# Patient Record
Sex: FEMALE | Race: WHITE | Employment: FULL TIME | URBAN - METROPOLITAN AREA
[De-identification: names, ages, dates, MRNs, and addresses within clinical notes are randomized per-mention and may not be internally consistent; named-entity substitution may affect disease eponyms.]

---

## 2020-07-01 ENCOUNTER — HOSPITAL ENCOUNTER (OUTPATIENT)
Age: 60
Discharge: HOME | End: 2020-07-01
Payer: COMMERCIAL

## 2020-07-01 ENCOUNTER — HOSPITAL ENCOUNTER (OUTPATIENT)
Dept: GENERAL RADIOLOGY | Age: 60
Discharge: HOME | End: 2020-07-01
Payer: COMMERCIAL

## 2020-07-01 PROCEDURE — 73501 X-RAY EXAM HIP UNI 1 VIEW: CPT

## 2020-07-01 PROCEDURE — 73564 X-RAY EXAM KNEE 4 OR MORE: CPT

## 2020-07-22 ENCOUNTER — OFFICE VISIT (OUTPATIENT)
Dept: ORTHOPEDIC SURGERY | Age: 60
End: 2020-07-22
Payer: COMMERCIAL

## 2020-07-22 VITALS — RESPIRATION RATE: 18 BRPM | HEIGHT: 69 IN | BODY MASS INDEX: 34.36 KG/M2 | WEIGHT: 232 LBS

## 2020-07-22 PROCEDURE — 20610 DRAIN/INJ JOINT/BURSA W/O US: CPT | Performed by: ORTHOPAEDIC SURGERY

## 2020-07-22 PROCEDURE — 99203 OFFICE O/P NEW LOW 30 MIN: CPT | Performed by: ORTHOPAEDIC SURGERY

## 2020-07-22 RX ORDER — POTASSIUM CHLORIDE 600 MG/1
TABLET, FILM COATED, EXTENDED RELEASE ORAL
COMMUNITY
Start: 2020-07-21

## 2020-07-22 RX ORDER — IBUPROFEN 200 MG
200 TABLET ORAL EVERY 6 HOURS PRN
COMMUNITY

## 2020-07-22 RX ORDER — BIMATOPROST 0.3 MG/ML
SOLUTION/ DROPS OPHTHALMIC
COMMUNITY
Start: 2020-07-21 | End: 2020-07-22

## 2020-07-22 RX ORDER — HYDROCHLOROTHIAZIDE 25 MG/1
TABLET ORAL
COMMUNITY
Start: 2020-07-21

## 2020-07-22 RX ORDER — GRAPE SEED EXT/BIOFLAV,CITRUS 50MG-250MG
CAPSULE ORAL
COMMUNITY

## 2020-07-22 RX ORDER — LATANOPROST 50 UG/ML
SOLUTION/ DROPS OPHTHALMIC
COMMUNITY
Start: 2020-07-05

## 2020-07-22 RX ORDER — LEVOTHYROXINE SODIUM 100 UG/1
TABLET ORAL
COMMUNITY
Start: 2020-07-06

## 2020-07-22 ASSESSMENT — ENCOUNTER SYMPTOMS
SHORTNESS OF BREATH: 0
EYE PAIN: 0
CHEST TIGHTNESS: 0
WHEEZING: 0
VOMITING: 0
COLOR CHANGE: 0
EYE REDNESS: 0

## 2020-07-22 NOTE — PROGRESS NOTES
7/22/2020   Chief Complaint   Patient presents with    Hip Pain     left hip pain x 2 years     Knee Pain     Right         History of Present Illness:                             Anabel Parker is a 63 y.o. female who presents today for evaluation of her left hip and right knee pain. The left hip has been painful for about 2 years and getting progressively worse with time. Her symptoms used to respond to chiropractic treatments but these have not been successful lately. She complains of a deep aching pain at the hip and stiffness issues with rotational movements of the hip. She has a constant aching pain that is worse with repetitive standing and walking activities. She has had no treatments for her hip in the past    Additionally she has dealt with progressive worsening right knee pain and stiffness and occasional swelling as well. She has a history of previous meniscus tear in the right knee which was treated with knee arthroscopy about 3 or 4 years ago. Initially she did well after the surgery but had a injury a few years ago where she landed onto a flexed knee and had worsening of her pain and swelling after the fall. Her knee has continued to give her trouble with pain swelling and stiffness since that injury. Her knee pain is worse on the lateral aspect and she occasionally gets catching and popping. The pain does travel down the lateral aspect of her shin which is worse with prolonged standing activities. Patient here for a new patient visit for evaluation of left hip pain. Onset has been for about 2 years or so with NKI or direct cause. She points to posterior lateral aspect into the groin. She reports no prior history of surgeries or injuries to this hip. She takes OTC motrin PRN otherwise no consecutive treatment on the left hip.         She also c/o chronic right knee pain.         Medical History  Patient's medications, allergies, past medical, surgical, social and family histories were reviewed and updated as appropriate. No past medical history on file. No family history on file. Social History     Socioeconomic History    Marital status: Single     Spouse name: None    Number of children: None    Years of education: None    Highest education level: None   Occupational History    None   Social Needs    Financial resource strain: None    Food insecurity     Worry: None     Inability: None    Transportation needs     Medical: None     Non-medical: None   Tobacco Use    Smoking status: Former Smoker     Last attempt to quit:      Years since quittin.5    Smokeless tobacco: Never Used   Substance and Sexual Activity    Alcohol use: None    Drug use: None    Sexual activity: None   Lifestyle    Physical activity     Days per week: None     Minutes per session: None    Stress: None   Relationships    Social connections     Talks on phone: None     Gets together: None     Attends Restorationist service: None     Active member of club or organization: None     Attends meetings of clubs or organizations: None     Relationship status: None    Intimate partner violence     Fear of current or ex partner: None     Emotionally abused: None     Physically abused: None     Forced sexual activity: None   Other Topics Concern    None   Social History Narrative    None     Current Outpatient Medications   Medication Sig Dispense Refill    hydroCHLOROthiazide (HYDRODIURIL) 25 MG tablet       UNITHROID 100 MCG tablet       potassium chloride (KLOR-CON) 8 MEQ extended release tablet       Black Cohosh 540 MG CAPS Take one capsule daily by mouth      latanoprost (XALATAN) 0.005 % ophthalmic solution       ibuprofen (ADVIL;MOTRIN) 200 MG tablet Take 200 mg by mouth every 6 hours as needed for Pain       No current facility-administered medications for this visit.       Allergies   Allergen Reactions    Penicillin G      Other reaction(s): Throat closes (severe)       Review of Systems:   Review of Systems   Constitutional: Negative for chills and fever. HENT: Negative for congestion and sneezing. Eyes: Negative for pain and redness. Respiratory: Negative for chest tightness, shortness of breath and wheezing. Cardiovascular: Negative for chest pain and palpitations. Gastrointestinal: Negative for vomiting. Musculoskeletal: Positive for arthralgias. Skin: Negative for color change and rash. Psychiatric/Behavioral: Negative for agitation. The patient is not nervous/anxious. Examination:  General Exam:  Vitals: Resp 18   Ht 5' 8.5\" (1.74 m)   Wt 232 lb (105.2 kg)   BMI 34.76 kg/m²    Physical Exam  Vitals signs and nursing note reviewed. Constitutional:       Appearance: Normal appearance. HENT:      Head: Normocephalic and atraumatic. Eyes:      Conjunctiva/sclera: Conjunctivae normal.      Pupils: Pupils are equal, round, and reactive to light. Neck:      Musculoskeletal: Normal range of motion. Pulmonary:      Effort: Pulmonary effort is normal.   Musculoskeletal:      Right hip: She exhibits normal range of motion, normal strength, no tenderness, no bony tenderness, no swelling, no crepitus and no deformity. Left hip: She exhibits decreased range of motion, decreased strength and tenderness. Left knee: She exhibits normal range of motion, no swelling, no effusion, no ecchymosis, no deformity, no laceration, normal alignment, no LCL laxity, normal meniscus and no MCL laxity. No tenderness found. No medial joint line and no lateral joint line tenderness noted. Comments: Left Lower Extremity:  There is moderate tenderness to palpation diffusely throughout the hip both anteriorly and posteriorly. Range of motion is significantly limited and painful at the extremes of motion.   Hip flexion present to 90 degrees, abduction 20 degrees, extension 5 degrees, internal rotation 10 degrees, external rotation 10 degrees. Strength is 5 out of 5 with hip flexion, extension, and abduction however this is somewhat limited due to pain with resistance testing. IRIS and FADIR test reproduces pain to the groin and hip joint. Sensation is intact to light touch in the left lower extremity. Pulses are intact. Skin is intact without erythema. No lower extremity edema. Right Lower Extremity:    There is moderate to severe tenderness to palpation diffusely throughout the knee, most significant along the lateral joint line. There is a moderate knee joint effusion present and mild global swelling anteriorly. Mild restricted range of motion at the knee with approximately 5 degrees lack of full extension and knee flexion up to 120 degrees with pain at the extremes of motion. There is mild crepitation during active range of motion. There is moderate valgus knee alignment. Well-healed surgical scars from previous knee arthroscopy. There is 5 out of 5 strength with knee flexion and extension. There is mild instability to varus and valgus stress testing with no laxity of the MCL but there is a painful clicking during this maneuver. With passive manipulation the knee is correctable to neutral alignment but with weightbearing she collapses into a valgus position. There is no instability with anterior and posterior drawer testing. Sensation is intact to light touch throughout the lower extremity. There is positive lateral Merrick's test with tenderness to palpation and pain along the medial joint line. Skin is intact. Pulses are intact    No pain with active range of motion of the hip. Strength and range of motion of the hip are intact. No tenderness to palpation at the hip. Skin:     General: Skin is warm and dry. Neurological:      Mental Status: She is alert and oriented to person, place, and time.    Psychiatric:         Mood and Affect: Mood normal.         Behavior: Behavior normal.            Diagnostic testing:  X-rays reviewed in office, I independently reviewed the films in the office today:   XR LEFT HIP 7/1/2020  Impression    1. No acute osseous abnormality. 2. Moderate to severe osteoarthritis of the left hip. There is evidence of advanced joint space collapse and narrowing through the left hip joint with medialization of the femoral head relative to the acetabulum. There is extensive subchondral sclerosis and cystic changes on both sides of the joint      XR RIGHT KNEE 7/1/2020      Impression    1. No acute osseous abnormality of the right knee. 2. Severe tricompartmental osteoarthritis of the right knee.      There is a valgus alignment of the knee with large bone spurs present most severe at the lateral compartment with complete loss of joint space laterally. There are tricompartmental degenerative findings present with a moderate valgus deformity of the knee. Office Procedures:  Orders Placed This Encounter   Procedures   [facility] Physical Therapy     Referral Priority:   Routine     Referral Type:   Eval and Treat     Referral Reason:   Specialty Services Required     Requested Specialty:   Physical Therapy     Number of Visits Requested:   1    NC ARTHROCENTESIS ASPIR&/INJ MAJOR JT/BURSA W/O US       Assessment and Plan  1. Right knee advanced tricompartmental valgus primary osteoarthritis    2. Left hip advanced primary osteoarthritis    I discussed the degenerative findings of the right knee on x-ray and exam with the patient. I have recommended maximizing conservative treatments for the arthritic knee condition. We discussed the use of steroid injections as needed for severe symptoms. Currently patient is having significant pain on a daily basis and this is impacting activities of daily living and ability to get comfortable at rest.  The patient would like to have an injection performed today.     Procedure Note, Right Knee Intraarticular Injection:  The right knee was prepped with alcohol and injected intra-articularly with 40 mg of Kenalog and 4 mL of 1% lidocaine through a 22-gauge needle. Sterile Band-Aid was applied. The patient tolerated it well without complications. I have recommended weight loss and maintaining a healthy weight to decrease the forces across the degenerative knee joint. We discussed the importance of maintaining flexibility and strength at the knee. I have advised the patient to have a home exercise program that includes stretching and low impact activities such as walking, biking, or elliptical machines. I have sent a referral for physical therapy to address the knee arthritic condition. The patient will learn a home exercise program.    I instructed the patient on the use of over-the-counter anti-inflammatory medications. We discussed her valgus alignment and I have recommended  bracing to correct her alignment issues and prevent further collapse into the valgus position. I sent a referral to [facility] to have her fit for the  brace. Follow-up in 6 weeks for check of the response to the injection and therapy. We also discussed in depth her left hip arthritic condition. I have explained to her that the x-rays show advanced disease and I do not expect significant improvement in her condition with nonoperative treatments. She would benefit from a total hip replacement however she does not think that she is able to consider surgical intervention at this time given her work and family situation. I have recommended that she look into this matter more and continue with her weight loss and anti-inflammatory medications and consider surgical intervention in the future.     Electronically signed by Freya Ribera MD on 7/22/2020 at 11:35 AM

## 2020-07-22 NOTE — PATIENT INSTRUCTIONS
Steroid injection   Rest right knee today   Work on ROM and strengthening of knees/legs   Physical therapy for left hip and right knee  [employer] rep will contact you for fitting of un- brace  Follow up in 6 weeks for recheck on response to injection in knee and PT on the hip and knee

## 2020-07-22 NOTE — PROGRESS NOTES
Patient here for a new patient visit for evaluation of left hip pain. Onset has been for about 2 years or so with NKI or direct cause. She points to posterior lateral aspect into the groin. She reports no prior history of surgeries or injuries to this hip. She takes OTC motrin PRN otherwise no consecutive treatment on the left hip. She also c/o chronic right knee pain. Provider needs to conduct a hands on physical today due to patients injury/diagnosis    XR LEFT HIP 7/1/2020  Impression    1. No acute osseous abnormality. 2. Moderate to severe osteoarthritis of the left hip. XR RIGHT KNEE 7/1/2020     Impression    1. No acute osseous abnormality of the right knee.     2. Severe tricompartmental osteoarthritis of the right knee.

## 2023-04-04 ENCOUNTER — TELEPHONE (OUTPATIENT)
Dept: CARDIOLOGY CLINIC | Age: 63
End: 2023-04-04

## 2023-04-04 NOTE — TELEPHONE ENCOUNTER
Left message at both phone numbers on 4/3/2023, and 4/4/2023 for patient to call and schedule consult per PCP.

## 2023-04-07 ENCOUNTER — TELEPHONE (OUTPATIENT)
Dept: CARDIOLOGY CLINIC | Age: 63
End: 2023-04-07

## 2023-04-07 NOTE — TELEPHONE ENCOUNTER
Left vm on both phone numbers listed to call and schedule her appt. with DR. Anderson 4/7/23 9:55 AM